# Patient Record
Sex: MALE
[De-identification: names, ages, dates, MRNs, and addresses within clinical notes are randomized per-mention and may not be internally consistent; named-entity substitution may affect disease eponyms.]

---

## 2019-03-05 ENCOUNTER — TRANSCRIPTION ENCOUNTER (OUTPATIENT)
Age: 79
End: 2019-03-05

## 2019-03-05 ENCOUNTER — INPATIENT (INPATIENT)
Facility: HOSPITAL | Age: 79
LOS: 0 days | Discharge: HOME | DRG: 395 | End: 2019-03-06
Attending: INTERNAL MEDICINE | Admitting: INTERNAL MEDICINE
Payer: COMMERCIAL

## 2019-03-05 VITALS
HEIGHT: 69 IN | HEART RATE: 67 BPM | DIASTOLIC BLOOD PRESSURE: 82 MMHG | WEIGHT: 175.05 LBS | SYSTOLIC BLOOD PRESSURE: 136 MMHG | TEMPERATURE: 97 F | RESPIRATION RATE: 20 BRPM | OXYGEN SATURATION: 99 %

## 2019-03-05 DIAGNOSIS — K62.5 HEMORRHAGE OF ANUS AND RECTUM: ICD-10-CM

## 2019-03-05 DIAGNOSIS — Z90.49 ACQUIRED ABSENCE OF OTHER SPECIFIED PARTS OF DIGESTIVE TRACT: Chronic | ICD-10-CM

## 2019-03-05 LAB
ALBUMIN SERPL ELPH-MCNC: 4.7 G/DL — SIGNIFICANT CHANGE UP (ref 3.3–5.2)
ALP SERPL-CCNC: 62 U/L — SIGNIFICANT CHANGE UP (ref 40–120)
ALT FLD-CCNC: 24 U/L — SIGNIFICANT CHANGE UP
ANION GAP SERPL CALC-SCNC: 10 MMOL/L — SIGNIFICANT CHANGE UP (ref 5–17)
APTT BLD: 33.2 SEC — SIGNIFICANT CHANGE UP (ref 27.5–36.3)
AST SERPL-CCNC: 30 U/L — SIGNIFICANT CHANGE UP
BASOPHILS # BLD AUTO: 0 K/UL — SIGNIFICANT CHANGE UP (ref 0–0.2)
BASOPHILS NFR BLD AUTO: 0.5 % — SIGNIFICANT CHANGE UP (ref 0–2)
BILIRUB SERPL-MCNC: 0.4 MG/DL — SIGNIFICANT CHANGE UP (ref 0.4–2)
BLD GP AB SCN SERPL QL: SIGNIFICANT CHANGE UP
BUN SERPL-MCNC: 19 MG/DL — SIGNIFICANT CHANGE UP (ref 8–20)
CALCIUM SERPL-MCNC: 10.6 MG/DL — HIGH (ref 8.6–10.2)
CHLORIDE SERPL-SCNC: 102 MMOL/L — SIGNIFICANT CHANGE UP (ref 98–107)
CO2 SERPL-SCNC: 29 MMOL/L — SIGNIFICANT CHANGE UP (ref 22–29)
CREAT SERPL-MCNC: 1.31 MG/DL — HIGH (ref 0.5–1.3)
EOSINOPHIL # BLD AUTO: 0.1 K/UL — SIGNIFICANT CHANGE UP (ref 0–0.5)
EOSINOPHIL NFR BLD AUTO: 1.7 % — SIGNIFICANT CHANGE UP (ref 0–5)
GLUCOSE SERPL-MCNC: 108 MG/DL — SIGNIFICANT CHANGE UP (ref 70–115)
HCT VFR BLD CALC: 43.9 % — SIGNIFICANT CHANGE UP (ref 42–52)
HCT VFR BLD CALC: 44.7 % — SIGNIFICANT CHANGE UP (ref 42–52)
HGB BLD-MCNC: 14.9 G/DL — SIGNIFICANT CHANGE UP (ref 14–18)
HGB BLD-MCNC: 15.2 G/DL — SIGNIFICANT CHANGE UP (ref 14–18)
INR BLD: 1.02 RATIO — SIGNIFICANT CHANGE UP (ref 0.88–1.16)
LYMPHOCYTES # BLD AUTO: 1.1 K/UL — SIGNIFICANT CHANGE UP (ref 1–4.8)
LYMPHOCYTES # BLD AUTO: 16.8 % — LOW (ref 20–55)
MCHC RBC-ENTMCNC: 29.6 PG — SIGNIFICANT CHANGE UP (ref 27–31)
MCHC RBC-ENTMCNC: 29.8 PG — SIGNIFICANT CHANGE UP (ref 27–31)
MCHC RBC-ENTMCNC: 33.9 G/DL — SIGNIFICANT CHANGE UP (ref 32–36)
MCHC RBC-ENTMCNC: 34 G/DL — SIGNIFICANT CHANGE UP (ref 32–36)
MCV RBC AUTO: 87.1 FL — SIGNIFICANT CHANGE UP (ref 80–94)
MCV RBC AUTO: 87.6 FL — SIGNIFICANT CHANGE UP (ref 80–94)
MONOCYTES # BLD AUTO: 0.7 K/UL — SIGNIFICANT CHANGE UP (ref 0–0.8)
MONOCYTES NFR BLD AUTO: 10.9 % — HIGH (ref 3–10)
NEUTROPHILS # BLD AUTO: 4.6 K/UL — SIGNIFICANT CHANGE UP (ref 1.8–8)
NEUTROPHILS NFR BLD AUTO: 69.9 % — SIGNIFICANT CHANGE UP (ref 37–73)
OB PNL STL: POSITIVE
PLATELET # BLD AUTO: 230 K/UL — SIGNIFICANT CHANGE UP (ref 150–400)
PLATELET # BLD AUTO: 232 K/UL — SIGNIFICANT CHANGE UP (ref 150–400)
POTASSIUM SERPL-MCNC: 4.2 MMOL/L — SIGNIFICANT CHANGE UP (ref 3.5–5.3)
POTASSIUM SERPL-SCNC: 4.2 MMOL/L — SIGNIFICANT CHANGE UP (ref 3.5–5.3)
PROT SERPL-MCNC: 8.2 G/DL — SIGNIFICANT CHANGE UP (ref 6.6–8.7)
PROTHROM AB SERPL-ACNC: 11.7 SEC — SIGNIFICANT CHANGE UP (ref 10–12.9)
RBC # BLD: 5.04 M/UL — SIGNIFICANT CHANGE UP (ref 4.6–6.2)
RBC # BLD: 5.1 M/UL — SIGNIFICANT CHANGE UP (ref 4.6–6.2)
RBC # FLD: 13.9 % — SIGNIFICANT CHANGE UP (ref 11–15.6)
RBC # FLD: 13.9 % — SIGNIFICANT CHANGE UP (ref 11–15.6)
SODIUM SERPL-SCNC: 141 MMOL/L — SIGNIFICANT CHANGE UP (ref 135–145)
TYPE + AB SCN PNL BLD: SIGNIFICANT CHANGE UP
WBC # BLD: 6.6 K/UL — SIGNIFICANT CHANGE UP (ref 4.8–10.8)
WBC # BLD: 8 K/UL — SIGNIFICANT CHANGE UP (ref 4.8–10.8)
WBC # FLD AUTO: 6.6 K/UL — SIGNIFICANT CHANGE UP (ref 4.8–10.8)
WBC # FLD AUTO: 8 K/UL — SIGNIFICANT CHANGE UP (ref 4.8–10.8)

## 2019-03-05 PROCEDURE — 99285 EMERGENCY DEPT VISIT HI MDM: CPT

## 2019-03-05 RX ORDER — SOD SULF/SODIUM/NAHCO3/KCL/PEG
1000 SOLUTION, RECONSTITUTED, ORAL ORAL
Qty: 0 | Refills: 0 | Status: COMPLETED | OUTPATIENT
Start: 2019-03-05 | End: 2019-03-05

## 2019-03-05 RX ORDER — SODIUM CHLORIDE 9 MG/ML
3 INJECTION INTRAMUSCULAR; INTRAVENOUS; SUBCUTANEOUS ONCE
Qty: 0 | Refills: 0 | Status: COMPLETED | OUTPATIENT
Start: 2019-03-05 | End: 2019-03-05

## 2019-03-05 RX ORDER — SODIUM CHLORIDE 9 MG/ML
1000 INJECTION INTRAMUSCULAR; INTRAVENOUS; SUBCUTANEOUS
Qty: 0 | Refills: 0 | Status: DISCONTINUED | OUTPATIENT
Start: 2019-03-05 | End: 2019-03-06

## 2019-03-05 RX ADMIN — Medication 1000 MILLILITER(S): at 17:36

## 2019-03-05 RX ADMIN — Medication 1000 MILLILITER(S): at 21:49

## 2019-03-05 RX ADMIN — SODIUM CHLORIDE 40 MILLILITER(S): 9 INJECTION INTRAMUSCULAR; INTRAVENOUS; SUBCUTANEOUS at 16:23

## 2019-03-05 RX ADMIN — SODIUM CHLORIDE 3 MILLILITER(S): 9 INJECTION INTRAMUSCULAR; INTRAVENOUS; SUBCUTANEOUS at 10:00

## 2019-03-05 NOTE — ED ADULT NURSE NOTE - CHPI ED NUR SYMPTOMS NEG
no fever/no nausea/no pain/no chills/no dizziness/no decreased eating/drinking/no tingling/no vomiting/no weakness

## 2019-03-05 NOTE — ED ADULT NURSE REASSESSMENT NOTE - NS ED NURSE REASSESS COMMENT FT1
Pt received from ED RN Daylin . Awake in bed. alert and oriented x4 VSS at this time. Presents in bed, in no acute distress.  Plan of care discussed with patient. Movi Prep to being at 6pm.. Safety maintained.
pt in no apparent distress, plan of care explained to pt, pt verbalized understanding. pt denies any pain at the moment.
pt status unchanged, refer to flowsheet and chart, pt safety maintained, pt hemodynamically stable. Pt awaiting MD for further evaluation. Pt resting comfortably. Pt safety maintained will continue to monitor. POC discussed with pt

## 2019-03-05 NOTE — ED ADULT NURSE NOTE - OBJECTIVE STATEMENT
Pt presents to ED with c/o rectal bleed x 3 days. Pt has no associated symptoms and denies any pain. Pt aox3, in no apparent distress, and has no other medical complaints at this time. Pt has hx of HTN

## 2019-03-05 NOTE — ED PROVIDER NOTE - OBJECTIVE STATEMENT
77 y/o M presents with intermittent rectal bleeding since sunday no pain no abd pain BRBPR on VANESSA no thinners     no fevers

## 2019-03-05 NOTE — H&P ADULT - ASSESSMENT
The patient is a 78 year old male who presented to the Er with complaints of bright red bleeding per rectum. According to the patient 3 days ago after he shovelled snow he noted bright red blood on his undergarments. He continued to have painless rectal bleeding which subsequently has slowed since then. No association with his bowel movements, denies abdominal pain, nausea or vomiting. In the ER, Hb/hct of 15. Bright red blood in the rectal vault on examination. Admitted for colonoscopy in am       Assessment/Plan:    1. Lower GI bleed: CBC Q6 hours, CLD, NPO after midnight for colonoscopy in am    2. SONIYA: baseline creatinine unknown. Gentle iv hydration  repeat BMP In am    VTE_ SCDS bilaterally    Plan discussed with the patient and his wife at the bedside in detail.

## 2019-03-05 NOTE — H&P ADULT - HISTORY OF PRESENT ILLNESS
The patient is a 78 year old male who presented to the Er with complaints of bright red bleeding per rectum. According to the patient 3 days ago after he shovelled snow he noted bright red blood on his undergarments. He continued to have painless rectal bleeding which subsequently has slowed since then. No association with his bowel movements, denies abdominal pain, nausea or vomiting. In the ER, Hb/hct of 15. Bright red blood in the rectal vault on examination. Admitted for colonoscopy in am

## 2019-03-05 NOTE — CONSULT NOTE ADULT - PROBLEM SELECTOR RECOMMENDATION 9
The pattern of his bleeding is most c/w ongoing hemorrhoidal bleeding or at the very least pathology in the rectum-cant exclude ulcer, proctitis or neoplasm. Suggest prep today for colonoscopy in AM.

## 2019-03-05 NOTE — PATIENT PROFILE ADULT - FUNCTIONAL SCREEN CURRENT LEVEL: SWALLOWING (IF SCORE 2 OR MORE FOR ANY ITEM, CONSULT REHAB SERVICES), MLM)
DISPLAY PLAN FREE TEXT
0 = swallows foods/liquids without difficulty

## 2019-03-05 NOTE — CONSULT NOTE ADULT - SUBJECTIVE AND OBJECTIVE BOX
Patient is a 78y old  Male who presents with a chief complaint of rectal bleeding    HPI: 78 year old english speaking  male who noted that hsi underpants were wet while shoveling snow three days ago. Whe he looked there was bright red blood on the underpants. He has continued to ooze blood onto his undergarments throughout the day but has his usual brown daily BM without change. There is no blood in the commode. The H/H today is normal. He denies any abdominal pain, nausea or vomiting. There is no diarrhea or constipation. There is no rectal pain. There is no light headedness although he experienced some transient diaphoresis on the day the bleeding was first noted. He underwent a screening colonoscopy about 6-7 years ago by someone in Dr. Moya's group that was apparently unremarkable excpet for perhaps a small ulcer. There are no dyspeptic symptoms.      REVIEW OF SYSTEMS:    CONSTITUTIONAL: No fever, weight loss, or fatigue  EYES: No eye pain, visual disturbances, or discharge  ENMT:  No difficulty hearing, tinnitus, vertigo; No sinus or throat pain  NECK: No pain or stiffness  RESPIRATORY: No cough, wheezing, chills or hemoptysis; No shortness of breath  CARDIOVASCULAR: No chest pain, palpitations, dizziness, or leg swelling  GASTROINTESTINAL: as above  NEUROLOGICAL: No headaches, memory loss, loss of strength, numbness, or tremors  SKIN: No itching, burning, rashes, or lesions   LYMPH NODES: No enlarged glands  MUSCULOSKELETAL: No joint pain or swelling; No muscle, back, or extremity pain  PSYCHIATRIC: No depression, anxiety, mood swings, or difficulty sleeping  HEME/LYMPH: No easy bruising, or bleeding gums  ALLERY AND IMMUNOLOGIC: No hives or eczema      PAST MEDICAL & SURGICAL HISTORY:  hypertension  appendectomy        FAMILY HISTORY:      SOCIAL HISTORY:  Smoking Status: [ ] Current, [ ] Former, [ x] Never  Pack Years:  Alcohol Use: social at most    Home Medications:      MEDICATIONS:  MEDICATIONS  (STANDING):    MEDICATIONS  (PRN):      Allergies    No Known Allergies    Intolerances        Vital Signs Last 24 Hrs  T(C): 36.3 (05 Mar 2019 08:58), Max: 36.3 (05 Mar 2019 08:58)  T(F): 97.3 (05 Mar 2019 08:58), Max: 97.3 (05 Mar 2019 08:58)  HR: 62 (05 Mar 2019 12:19) (62 - 67)  BP: 127/80 (05 Mar 2019 12:19) (127/80 - 136/82)  BP(mean): --  RR: 16 (05 Mar 2019 12:19) (16 - 20)  SpO2: 98% (05 Mar 2019 12:19) (98% - 99%)        PHYSICAL EXAM:    General: Well developed; well nourished; in no acute distress  HEENT: MMM, conjunctiva and sclera clear  Lungs: Clear  Heart: Rhythm Regular, No Murmurs  Gastrointestinal: Soft, non-tender non-distended; Normal bowel sounds; No rebound or guarding; No Organomegaly & No Masses  Extremities: Normal range of motion, No clubbing, cyanosis or edema  Neurological: Alert and oriented x3, Non-focal  Skin: Warm and dry. No obvious rash  Rectal: No Masses, fresh red blood in the rectal vault      LABS:                        15.2   6.6   )-----------( 232      ( 05 Mar 2019 10:34 )             44.7     03-05    141  |  102  |  19.0  ----------------------------<  108  4.2   |  29.0  |  1.31<H>    Ca    10.6<H>      05 Mar 2019 10:34    TPro  8.2  /  Alb  4.7  /  TBili  0.4  /  DBili  x   /  AST  30  /  ALT  24  /  AlkPhos  62  03-05

## 2019-03-05 NOTE — ED ADULT NURSE NOTE - NSIMPLEMENTINTERV_GEN_ALL_ED
Implemented All Universal Safety Interventions:  [place] to call system. Call bell, personal items and telephone within reach. Instruct patient to call for assistance. Room bathroom lighting operational. Non-slip footwear when patient is off stretcher. Physically safe environment: no spills, clutter or unnecessary equipment. Stretcher in lowest position, wheels locked, appropriate side rails in place.

## 2019-03-06 ENCOUNTER — TRANSCRIPTION ENCOUNTER (OUTPATIENT)
Age: 79
End: 2019-03-06

## 2019-03-06 VITALS
HEART RATE: 70 BPM | DIASTOLIC BLOOD PRESSURE: 68 MMHG | TEMPERATURE: 98 F | OXYGEN SATURATION: 98 % | RESPIRATION RATE: 18 BRPM | SYSTOLIC BLOOD PRESSURE: 122 MMHG

## 2019-03-06 LAB
ANION GAP SERPL CALC-SCNC: 12 MMOL/L — SIGNIFICANT CHANGE UP (ref 5–17)
BUN SERPL-MCNC: 17 MG/DL — SIGNIFICANT CHANGE UP (ref 8–20)
CALCIUM SERPL-MCNC: 9 MG/DL — SIGNIFICANT CHANGE UP (ref 8.6–10.2)
CHLORIDE SERPL-SCNC: 106 MMOL/L — SIGNIFICANT CHANGE UP (ref 98–107)
CO2 SERPL-SCNC: 25 MMOL/L — SIGNIFICANT CHANGE UP (ref 22–29)
CREAT SERPL-MCNC: 1.21 MG/DL — SIGNIFICANT CHANGE UP (ref 0.5–1.3)
GLUCOSE SERPL-MCNC: 85 MG/DL — SIGNIFICANT CHANGE UP (ref 70–115)
HCT VFR BLD CALC: 42.7 % — SIGNIFICANT CHANGE UP (ref 42–52)
HCT VFR BLD CALC: 43.3 % — SIGNIFICANT CHANGE UP (ref 42–52)
HGB BLD-MCNC: 14.3 G/DL — SIGNIFICANT CHANGE UP (ref 14–18)
HGB BLD-MCNC: 14.4 G/DL — SIGNIFICANT CHANGE UP (ref 14–18)
MCHC RBC-ENTMCNC: 29.1 PG — SIGNIFICANT CHANGE UP (ref 27–31)
MCHC RBC-ENTMCNC: 29.4 PG — SIGNIFICANT CHANGE UP (ref 27–31)
MCHC RBC-ENTMCNC: 33.3 G/DL — SIGNIFICANT CHANGE UP (ref 32–36)
MCHC RBC-ENTMCNC: 33.5 G/DL — SIGNIFICANT CHANGE UP (ref 32–36)
MCV RBC AUTO: 87.5 FL — SIGNIFICANT CHANGE UP (ref 80–94)
MCV RBC AUTO: 87.7 FL — SIGNIFICANT CHANGE UP (ref 80–94)
PLATELET # BLD AUTO: 207 K/UL — SIGNIFICANT CHANGE UP (ref 150–400)
PLATELET # BLD AUTO: 228 K/UL — SIGNIFICANT CHANGE UP (ref 150–400)
POTASSIUM SERPL-MCNC: 4.4 MMOL/L — SIGNIFICANT CHANGE UP (ref 3.5–5.3)
POTASSIUM SERPL-SCNC: 4.4 MMOL/L — SIGNIFICANT CHANGE UP (ref 3.5–5.3)
RBC # BLD: 4.87 M/UL — SIGNIFICANT CHANGE UP (ref 4.6–6.2)
RBC # BLD: 4.95 M/UL — SIGNIFICANT CHANGE UP (ref 4.6–6.2)
RBC # FLD: 14 % — SIGNIFICANT CHANGE UP (ref 11–15.6)
RBC # FLD: 14 % — SIGNIFICANT CHANGE UP (ref 11–15.6)
SODIUM SERPL-SCNC: 143 MMOL/L — SIGNIFICANT CHANGE UP (ref 135–145)
WBC # BLD: 6.2 K/UL — SIGNIFICANT CHANGE UP (ref 4.8–10.8)
WBC # BLD: 6.7 K/UL — SIGNIFICANT CHANGE UP (ref 4.8–10.8)
WBC # FLD AUTO: 6.2 K/UL — SIGNIFICANT CHANGE UP (ref 4.8–10.8)
WBC # FLD AUTO: 6.7 K/UL — SIGNIFICANT CHANGE UP (ref 4.8–10.8)

## 2019-03-06 PROCEDURE — 80053 COMPREHEN METABOLIC PANEL: CPT

## 2019-03-06 PROCEDURE — 86901 BLOOD TYPING SEROLOGIC RH(D): CPT

## 2019-03-06 PROCEDURE — 45378 DIAGNOSTIC COLONOSCOPY: CPT

## 2019-03-06 PROCEDURE — 82272 OCCULT BLD FECES 1-3 TESTS: CPT

## 2019-03-06 PROCEDURE — 80048 BASIC METABOLIC PNL TOTAL CA: CPT

## 2019-03-06 PROCEDURE — 36415 COLL VENOUS BLD VENIPUNCTURE: CPT

## 2019-03-06 PROCEDURE — 85730 THROMBOPLASTIN TIME PARTIAL: CPT

## 2019-03-06 PROCEDURE — 85027 COMPLETE CBC AUTOMATED: CPT

## 2019-03-06 PROCEDURE — 99238 HOSP IP/OBS DSCHRG MGMT 30/<: CPT

## 2019-03-06 PROCEDURE — 86850 RBC ANTIBODY SCREEN: CPT

## 2019-03-06 PROCEDURE — 99285 EMERGENCY DEPT VISIT HI MDM: CPT

## 2019-03-06 PROCEDURE — 86900 BLOOD TYPING SEROLOGIC ABO: CPT

## 2019-03-06 PROCEDURE — 85610 PROTHROMBIN TIME: CPT

## 2019-03-06 RX ORDER — HYDROCORTISONE 20 MG
25 TABLET ORAL
Qty: 10 | Refills: 0 | OUTPATIENT
Start: 2019-03-06

## 2019-03-06 RX ADMIN — SODIUM CHLORIDE 40 MILLILITER(S): 9 INJECTION INTRAMUSCULAR; INTRAVENOUS; SUBCUTANEOUS at 05:45

## 2019-03-06 NOTE — DISCHARGE NOTE ADULT - CARE PLAN
Principal Discharge DX:	Blood per rectum  Goal:	resolutions of sxs, evaluate source of bleeding  Assessment and plan of treatment:	s/p colonoscopy Principal Discharge DX:	Blood per rectum  Goal:	resolutions of sxs, evaluate source of bleeding  Assessment and plan of treatment:	s/p colonoscopy - internal hemorrhoid - start Hemorrhoidal cream

## 2019-03-06 NOTE — BRIEF OPERATIVE NOTE - OPERATION/FINDINGS
Universal diverticulosis with extensive diverticuli in left colon. Large thrombosed internal hemorrhoids noted on retroflexion. Universal diverticulosis with extensive diverticuli in left colon. Large thrombosed internal hemorrhoids noted on retroflexion. Prep quality was good. Withdrawal time: 06:30

## 2019-03-06 NOTE — DISCHARGE NOTE ADULT - PLAN OF CARE
resolutions of sxs, evaluate source of bleeding s/p colonoscopy s/p colonoscopy - internal hemorrhoid - start Hemorrhoidal cream

## 2019-03-06 NOTE — DISCHARGE NOTE ADULT - MEDICATION SUMMARY - MEDICATIONS TO TAKE
I will START or STAY ON the medications listed below when I get home from the hospital:    Colocort 100 mg/60 mL rectal suspension  -- 25 microgram(s) rectally prn   -- For rectal use only.  It is very important that you take or use this exactly as directed.  Do not skip doses or discontinue unless directed by your doctor.  Obtain medical advice before taking any non-prescription drugs as some may affect the action of this medication.  Shake well before use.    -- Indication: For Hemorrhoid

## 2019-03-06 NOTE — DISCHARGE NOTE ADULT - HOSPITAL COURSE
The patient is a 78 year old male who presented to the Er with complaints of bright red bleeding per rectum. According to the patient 3 days ago after he shovelled snow he noted bright red blood on his undergarments. He continued to have painless rectal bleeding which subsequently has slowed since then. No association with his bowel movements, denies abdominal pain, nausea or vomiting. In the ER, Hb/hct of 15. Bright red blood in the rectal vault on examination. Admitted for colonoscopy in today.    GI performed colonoscopy- post-op dx- Diverticulosis of colon without hemorrhage, internal hemorrhoids  Pt. likely bled from internal hemorrhoids. Low volume hematochezia likely  internal hemorrhoidal in etiology. OK to send pt. home today from a GI standpoint on Hydrocortisone 25 mg. suppositories, one AL QHS PRN further internal hemorrhoidal bleeding. Ok to re feed pt. today. OPT f/u in our office in 4 to 6 weeks.    Pt did well post-op                          14.3   6.2   )-----------( 228      ( 06 Mar 2019 08:52 )             42.7 The patient is a 78 year old male who presented to the Er with complaints of bright red bleeding per rectum. According to the patient 3 days ago after he shovelled snow he noted bright red blood on his undergarments. He continued to have painless rectal bleeding which subsequently has slowed since then. No association with his bowel movements, denies abdominal pain, nausea or vomiting. In the ER, Hb/hct of 15. Bright red blood in the rectal vault on examination. s/p  colonoscopy- post-op dx- Diverticulosis of colon without hemorrhage, internal hemorrhoids  Pt. likely bled from internal hemorrhoids. Low volume hematochezia likely  internal hemorrhoidal in etiology. OK to send pt. home today from a GI standpoint on Hydrocortisone 25 mg. suppositories, one [place] QHS PRN further internal hemorrhoidal bleeding. OPT f/u in our office in 4 to 6 weeks.    Pt did well post-op                          14.3   6.2   )-----------( 228      ( 06 Mar 2019 08:52 )             42.7

## 2019-03-06 NOTE — DISCHARGE NOTE ADULT - CARE PROVIDER_API CALL
Osorio Navarro)  Gastroenterology; Internal Medicine  [address] [facility] [address] [facility] [address]  [place] [address]  Phone: [phone]  Fax: [phone]  Follow Up Time:

## 2019-03-06 NOTE — PROGRESS NOTE ADULT - SUBJECTIVE AND OBJECTIVE BOX
MARYAM SIMEON    [id number]    78y      Male    INTERVAL HPI/OVERNIGHT EVENTS:  Pt seen resting this morning.  No new complaints.  Awaiting colonoscopy.  Denies any more BRBPR. Denies abd pain/ n/v.  Denies prior hx of similar episodes.    REVIEW OF SYSTEMS:    CONSTITUTIONAL: No fever, weight loss, or fatigue  RESPIRATORY: No cough, wheezing, hemoptysis; No shortness of breath  CARDIOVASCULAR: No chest pain, palpitations  GASTROINTESTINAL: No abdominal or epigastric pain. No nausea, vomiting  NEUROLOGICAL: No headaches, memory loss, loss of strength.    Vital Signs Last 24 Hrs  T(C): 36.7 (06 Mar 2019 07:29), Max: 37 (05 Mar 2019 15:33)  T(F): 98.1 (06 Mar 2019 07:29), Max: 98.6 (05 Mar 2019 15:33)  HR: 62 (06 Mar 2019 07:29) (62 - 82)  BP: 105/65 (06 Mar 2019 07:29) (105/65 - 139/83)  RR: 17 (06 Mar 2019 07:29) (16 - 18)  SpO2: 98% (06 Mar 2019 07:29) (96% - 98%)    PHYSICAL EXAM:    GENERAL: NAD, well-groomed  HEENT: PERRL, +EOMI  NECK: soft, Supple, No JVD,   CHEST/LUNG: Clear to ascultation bilaterally; No wheezing  HEART: S1S2+, Regular rate and rhythm; No murmurs, rubs, or gallops  ABDOMEN: Soft, Nontender, Nondistended; Bowel sounds present  EXTREMITIES:  2+ Peripheral Pulses, No clubbing, cyanosis, or edema  SKIN: No rashes or lesions  NEURO: AAOX3, no focal deficits, no motor r sensory loss  PSYCH: normal mood      LABS:                        14.3   6.2   )-----------( 228      ( 06 Mar 2019 08:52 )             42.7     03-06    143  |  106  |  17.0  ----------------------------<  85  4.4   |  25.0  |  1.21    Ca    9.0      06 Mar 2019 08:52    TPro  8.2  /  Alb  4.7  /  TBili  0.4  /  DBili  x   /  AST  30  /  ALT  24  /  AlkPhos  62  03-05    PT/INR - ( 05 Mar 2019 10:34 )   PT: 11.7 sec;   INR: 1.02 ratio         PTT - ( 05 Mar 2019 10:34 )  PTT:33.2 sec        MEDICATIONS  (STANDING):  sodium chloride 0.9%. 1000 milliLiter(s) (75 mL/Hr) IV Continuous <Continuous>

## 2019-03-06 NOTE — BRIEF OPERATIVE NOTE - POST-OP DX
Diverticulosis of colon without hemorrhage  03/06/2019    Active  Osorio Navarro  Internal hemorrhoids with complication  03/06/2019    Osorio Quinonez

## 2019-03-06 NOTE — DISCHARGE NOTE ADULT - ADDITIONAL INSTRUCTIONS
Hydrocortisone 25 mg. suppositories, one [place] QHS PRN further internal hemorrhoidal bleeding. Follow-up with Gastroenerologist in office in 4 to 6 weeks.

## 2019-03-06 NOTE — PROGRESS NOTE ADULT - ATTENDING COMMENTS
Pt is seen and examined, feeling better, no BM, schedule for colonoscopy today  exam- abd- soft, nt, bs+ve  chest - cta b/l  cvs- s1/s2 audible    h&H stable

## 2019-03-06 NOTE — PROGRESS NOTE ADULT - SUBJECTIVE AND OBJECTIVE BOX
The patient is a 78y old male who presents with a complaint of rectal bleeding of bright red   blood.  His stool was positive for occult blood.   He is scheduled to have a COLONOSCOPY.     (05 Mar 2019 14:12)      PAST MEDICAL HISTORY:  Hypertension x 2 years      PAST SURGICAL HISTORY:  Appendectomy - 50 years ago      MEDICATIONS  (STANDING):  sodium chloride 0.9%. 1000 milliLiter(s) (40 mL/Hr) IV Continuous <Continuous>    MEDICATIONS  (PRN):      Allergies:     No Known Allergies      SOCIAL HISTORY:     He drinks red wine or scotch once a week or less.  He quit smoking at                                 age 23.  He never used illicit drugs.     Height (cm): 175.26 (03-05-19 @ 08:58)  Weight (kg): 79.4 (03-05-19 @ 08:58)  BMI (kg/m2): 25.8 (03-05-19 @ 08:58)                          14.4   6.7   )-----------( 207      ( 06 Mar 2019 02:57 )             43.3       PT/INR - ( 05 Mar 2019 10:34 )   PT: 11.7 sec;   INR: 1.02 ratio         PTT - ( 05 Mar 2019 10:34 )  PTT:33.2 sec    03-05    141  |  102  |  19.0  ----------------------------<  108  4.2   |  29.0  |  1.31<H>    Ca    10.6<H>      05 Mar 2019 10:34    TPro  8.2  /  Alb  4.7  /  TBili  0.4  /  DBili  x   /  AST  30  /  ALT  24  /  AlkPhos  62  03-05    EKG:  -  None    TT ECHO:  -  None    ASA # =  2    Mallampati # = 2

## 2019-03-06 NOTE — PROGRESS NOTE ADULT - ASSESSMENT
The patient is a 78 year old male who presented to the Er with complaints of bright red bleeding per rectum. According to the patient 3 days ago after he shovelled snow he noted bright red blood on his undergarments. He continued to have painless rectal bleeding which subsequently has slowed since then. No association with his bowel movements, denies abdominal pain, nausea or vomiting. In the ER, Hb/hct of 15. Bright red blood in the rectal vault on examination. Admitted for colonoscopy in today.      Assessment/Plan:    1. Lower GI bleed: CBC Q6 hours,  H/H stable. CLD, NPO after midnight for colonoscopy today    2. SONIYA: baseline creatinine unknown. Gentle iv hydration  repeat BMP slightly improved to 1.21    VTE_ SCDS bilaterally The patient is a 78 year old male who presented to the Er with complaints of bright red bleeding per rectum. According to the patient 3 days ago after he shovelled snow he noted bright red blood on his undergarments. He continued to have painless rectal bleeding which subsequently has slowed since then. No association with his bowel movements, denies abdominal pain, nausea or vomiting. In the ER, Hb/hct of 15. Bright red blood in the rectal vault on examination. Admitted for colonoscopy in today.      Assessment/Plan:    1. Lower GI bleed: CBC Q6 hours,  H/H stable. CLD, NPO after midnight for colonoscopy today      VTE_ SCDS bilaterally

## 2019-03-06 NOTE — BRIEF OPERATIVE NOTE - COMMENTS
Pt. likely bled from internal hemorrhoids. Low volume hematochezia likely  internal hemorrhoidal in etiology. OK to send pt. home today from a GI standpoint on Hydrocortisone 25 mg. suppositories, one [place] QHS PRN further internal hemorrhoidal bleeding. Ok to re feed pt. today. OPT f/u in our office in 4 to 6 weeks.

## 2019-03-06 NOTE — DISCHARGE NOTE ADULT - PATIENT PORTAL LINK FT
You can access the [facility] Patient Portal, offered by [facility], by registering with the following website: [email or web address]

## 2019-05-08 NOTE — CDI QUERY NOTE - NSCDIOTHERTXTBX_GEN_ALL_CORE_HH
[facility] uses the KDIGO criteria (0.3 increase in Crt or 1.5X the baseline retrospectively) to determine SONIYA.  This patient does not meet that criteria.  Please document an addendum to the D/C summary your criteria for the diagnosis of SONIYA, or document if SONIYA was ruled out.  Thank you      Creatinine Trend:  Creatinine, Serum: 1.21 mg/dL (03.06.19 @ 08:52)  Creatinine, Serum: 1.31 mg/dL (03.05.19 @ 10:34)      Chart Documentation:    H&P:  2. SONIYA: baseline creatinine unknown. Gentle iv hydration  repeat BMP In am

## 2020-11-11 ENCOUNTER — APPOINTMENT (OUTPATIENT)
Dept: NEPHROLOGY | Facility: CLINIC | Age: 80
End: 2020-11-11
Payer: MEDICARE

## 2020-11-11 VITALS
HEIGHT: 69 IN | WEIGHT: 169 LBS | SYSTOLIC BLOOD PRESSURE: 124 MMHG | BODY MASS INDEX: 25.03 KG/M2 | HEART RATE: 60 BPM | DIASTOLIC BLOOD PRESSURE: 74 MMHG | TEMPERATURE: 97.2 F

## 2020-11-11 DIAGNOSIS — Z86.79 PERSONAL HISTORY OF OTHER DISEASES OF THE CIRCULATORY SYSTEM: ICD-10-CM

## 2020-11-11 DIAGNOSIS — I10 ESSENTIAL (PRIMARY) HYPERTENSION: ICD-10-CM

## 2020-11-11 DIAGNOSIS — Z78.9 OTHER SPECIFIED HEALTH STATUS: ICD-10-CM

## 2020-11-11 DIAGNOSIS — N18.9 CHRONIC KIDNEY DISEASE, UNSPECIFIED: ICD-10-CM

## 2020-11-11 PROCEDURE — 99072 ADDL SUPL MATRL&STAF TM PHE: CPT

## 2020-11-11 PROCEDURE — 99205 OFFICE O/P NEW HI 60 MIN: CPT

## 2020-11-11 NOTE — ASSESSMENT
[FreeTextEntry1] : 1) CKD stage 3\par Labs from PCP's office reviewed\par SC[facility] ~1.23\[facility]\par he likely has age related CKD; also has HTN which is contributing\par UA bland\par Obtain Renal US\par Avoid NSAIDs\par \par 2) HTN\par BP stable\par Pt to update us with medication name once he gets home\par \par RTC in 4 months

## 2020-11-11 NOTE — PHYSICAL EXAM
[General Appearance - Alert] : alert [General Appearance - In No Acute Distress] : in no acute distress [Sclera] : the sclera and conjunctiva were normal [PERRL With Normal Accommodation] : pupils were equal in size, round, and reactive to light [Outer Ear] : the ears and nose were normal in appearance [Neck Appearance] : the appearance of the neck was normal [Neck Cervical Mass (___cm)] : no neck mass was observed [Jugular Venous Distention Increased] : there was no jugular-venous distention [Auscultation Breath Sounds / Voice Sounds] : lungs were clear to auscultation bilaterally [Heart Rate And Rhythm] : heart rate was normal and rhythm regular [Heart Sounds] : normal S1 and S2 [Murmurs] : no murmurs [Edema] : there was no peripheral edema [Abdomen Soft] : soft [Abdomen Tenderness] : non-tender [No CVA Tenderness] : no ~M costovertebral angle tenderness [Abnormal Walk] : normal gait [Nail Clubbing] : no clubbing  or cyanosis of the fingernails [Skin Color & Pigmentation] : normal skin color and pigmentation [] : no rash [Deep Tendon Reflexes (DTR)] : deep tendon reflexes were 2+ and symmetric [Sensation] : the sensory exam was normal to light touch and pinprick [No Focal Deficits] : no focal deficits [Oriented To Time, Place, And Person] : oriented to person, place, and time [Impaired Insight] : insight and judgment were intact [Affect] : the affect was normal [Occult Blood Positive] : stool was negative for occult blood

## 2020-11-11 NOTE — HISTORY OF PRESENT ILLNESS
[FreeTextEntry1] : 80 year old male pt with PMH of HTN, Glaucoma presenting for intiial evaluation of renal insufficiency.\par PCP; Dr Lilly Castillo\par \par Pt seen and examined\par States he feels well\par Dx with HTN about 4 years; takes 1 medication but cant recall name\par Denies NSAID use\par \par Originally from [place]; retired \par  for 50 years; has 5 kids\par \par

## 2020-11-30 ENCOUNTER — OUTPATIENT (OUTPATIENT)
Dept: OUTPATIENT SERVICES | Facility: HOSPITAL | Age: 80
LOS: 1 days | End: 2020-11-30

## 2020-11-30 ENCOUNTER — APPOINTMENT (OUTPATIENT)
Dept: ULTRASOUND IMAGING | Facility: CLINIC | Age: 80
End: 2020-11-30
Payer: MEDICARE

## 2020-11-30 DIAGNOSIS — Z90.49 ACQUIRED ABSENCE OF OTHER SPECIFIED PARTS OF DIGESTIVE TRACT: Chronic | ICD-10-CM

## 2020-11-30 DIAGNOSIS — Z00.00 ENCOUNTER FOR GENERAL ADULT MEDICAL EXAMINATION WITHOUT ABNORMAL FINDINGS: ICD-10-CM

## 2020-11-30 PROCEDURE — 76775 US EXAM ABDO BACK WALL LIM: CPT | Mod: 26

## 2021-02-10 ENCOUNTER — APPOINTMENT (OUTPATIENT)
Dept: NEPHROLOGY | Facility: CLINIC | Age: 81
End: 2021-02-10

## 2023-01-20 ENCOUNTER — OFFICE (OUTPATIENT)
Dept: URBAN - METROPOLITAN AREA CLINIC 94 | Facility: CLINIC | Age: 83
Setting detail: OPHTHALMOLOGY
End: 2023-01-20
Payer: COMMERCIAL

## 2023-01-20 DIAGNOSIS — H40.1122: ICD-10-CM

## 2023-01-20 DIAGNOSIS — H25.13: ICD-10-CM

## 2023-01-20 DIAGNOSIS — H40.1112: ICD-10-CM

## 2023-01-20 DIAGNOSIS — H04.123: ICD-10-CM

## 2023-01-20 PROCEDURE — 99213 OFFICE O/P EST LOW 20 MIN: CPT | Performed by: OPHTHALMOLOGY

## 2023-01-20 PROCEDURE — 92250 FUNDUS PHOTOGRAPHY W/I&R: CPT | Performed by: OPHTHALMOLOGY

## 2023-01-20 ASSESSMENT — REFRACTION_MANIFEST
OD_ADD: +2.75
OS_AXIS: 105
OS_SPHERE: +2.75
OD_VA1: 20/20
OD_CYLINDER: -1.00
OS_VA1: 20/20
OD_VA2: 20/20
OD_AXIS: 120
OS_CYLINDER: -1.00
OS_VA2: 20/20
OD_SPHERE: +3.00
OS_ADD: +2.75

## 2023-01-20 ASSESSMENT — SUPERFICIAL PUNCTATE KERATITIS (SPK)
OD_SPK: 2+
OS_SPK: 1+

## 2023-01-20 ASSESSMENT — REFRACTION_CURRENTRX
OD_VPRISM_DIRECTION: BF
OS_AXIS: 117
OS_OVR_VA: 20/
OS_ADD: +3.25
OS_AXIS: 112
OS_OVR_VA: 20/
OS_SPHERE: +3.00
OD_ADD: +3.00
OD_SPHERE: +2.50
OD_SPHERE: +3.00
OD_ADD: +3.00
OD_ADD: +2.75
OS_SPHERE: +3.00
OS_CYLINDER: -0.75
OD_ADD: +3.00
OS_ADD: +3.00
OD_CYLINDER: -0.25
OD_VPRISM_DIRECTION: BF
OS_SPHERE: +2.75
OD_OVR_VA: 20/
OD_CYLINDER: -1.00
OS_CYLINDER: 0.00
OD_SPHERE: +3.00
OS_SPHERE: +3.50
OS_AXIS: 0
OS_VPRISM_DIRECTION: BF
OD_CYLINDER: -1.25
OS_ADD: +2.75
OD_AXIS: 122
OS_AXIS: 094
OD_CYLINDER: 0.00
OS_CYLINDER: -1.00
OS_ADD: +3.00
OD_CYLINDER: -1.00
OS_VPRISM_DIRECTION: BF
OS_AXIS: 104
OD_OVR_VA: 20/
OS_VPRISM_DIRECTION: BF
OD_VPRISM_DIRECTION: BF
OS_SPHERE: +2.50
OD_AXIS: 116
OD_AXIS: 092
OD_AXIS: 119
OD_OVR_VA: 20/
OD_SPHERE: +3.50
OS_CYLINDER: -1.25
OD_SPHERE: +3.25
OS_CYLINDER: -1.25
OD_AXIS: 0
OS_OVR_VA: 20/

## 2023-01-20 ASSESSMENT — PACHYMETRY
OS_CT_CORRECTION: 0
OD_CT_UM: 533
OS_CT_UM: 540
OD_CT_CORRECTION: 1

## 2023-01-20 ASSESSMENT — SPHEQUIV_DERIVED
OD_SPHEQUIV: 2.75
OS_SPHEQUIV: 2.25
OS_SPHEQUIV: 3.5
OD_SPHEQUIV: 2.5

## 2023-01-20 ASSESSMENT — AXIALLENGTH_DERIVED
OS_AL: 23.3604
OS_AL: 22.8924
OD_AL: 23.0827
OD_AL: 23.1763

## 2023-01-20 ASSESSMENT — CONFRONTATIONAL VISUAL FIELD TEST (CVF)
OD_FINDINGS: FULL
OS_FINDINGS: FULL

## 2023-01-20 ASSESSMENT — KERATOMETRY
OS_AXISANGLE_DEGREES: 015
OD_K1POWER_DIOPTERS: 41.50
OD_AXISANGLE_DEGREES: 026
OS_K1POWER_DIOPTERS: 41.25
METHOD_AUTO_MANUAL: AUTO
OD_K2POWER_DIOPTERS: 42.50
OS_K2POWER_DIOPTERS: 42.25

## 2023-01-20 ASSESSMENT — REFRACTION_AUTOREFRACTION
OD_AXIS: 131
OD_SPHERE: +3.50
OS_SPHERE: +4.25
OD_CYLINDER: -1.50
OS_CYLINDER: -1.50
OS_AXIS: 102

## 2023-01-20 ASSESSMENT — TONOMETRY
OD_IOP_MMHG: 14
OS_IOP_MMHG: 14
OS_IOP_MMHG: 15

## 2023-01-20 ASSESSMENT — VISUAL ACUITY
OD_BCVA: 20/40+1
OS_BCVA: 20/30-1

## 2023-05-26 ENCOUNTER — OFFICE (OUTPATIENT)
Dept: URBAN - METROPOLITAN AREA CLINIC 94 | Facility: CLINIC | Age: 83
Setting detail: OPHTHALMOLOGY
End: 2023-05-26
Payer: COMMERCIAL

## 2023-05-26 DIAGNOSIS — H25.13: ICD-10-CM

## 2023-05-26 DIAGNOSIS — H04.123: ICD-10-CM

## 2023-05-26 DIAGNOSIS — H40.1122: ICD-10-CM

## 2023-05-26 DIAGNOSIS — H40.1112: ICD-10-CM

## 2023-05-26 PROCEDURE — 92014 COMPRE OPH EXAM EST PT 1/>: CPT | Performed by: OPHTHALMOLOGY

## 2023-05-26 PROCEDURE — 92133 CPTRZD OPH DX IMG PST SGM ON: CPT | Performed by: OPHTHALMOLOGY

## 2023-05-26 ASSESSMENT — AXIALLENGTH_DERIVED
OS_AL: 22.8924
OD_AL: 23.0827
OS_AL: 23.3604
OD_AL: 23.1763

## 2023-05-26 ASSESSMENT — REFRACTION_MANIFEST
OS_VA2: 20/20
OS_ADD: +2.75
OD_VA1: 20/20
OS_VA1: 20/20
OD_SPHERE: +3.00
OS_CYLINDER: -1.00
OD_ADD: +2.75
OS_AXIS: 105
OD_VA2: 20/20
OS_SPHERE: +2.75
OD_AXIS: 120
OD_CYLINDER: -1.00

## 2023-05-26 ASSESSMENT — KERATOMETRY
OS_AXISANGLE_DEGREES: 015
OD_K2POWER_DIOPTERS: 42.50
OD_K1POWER_DIOPTERS: 41.50
METHOD_AUTO_MANUAL: AUTO
OS_K2POWER_DIOPTERS: 42.25
OD_AXISANGLE_DEGREES: 026
OS_K1POWER_DIOPTERS: 41.25

## 2023-05-26 ASSESSMENT — REFRACTION_CURRENTRX
OS_CYLINDER: -1.00
OD_ADD: +3.00
OS_AXIS: 112
OS_SPHERE: +3.00
OD_SPHERE: +3.50
OD_VPRISM_DIRECTION: BF
OD_AXIS: 0
OS_VPRISM_DIRECTION: BF
OS_CYLINDER: -1.25
OS_ADD: +3.25
OD_AXIS: 092
OS_AXIS: 094
OD_OVR_VA: 20/
OS_SPHERE: +2.50
OS_ADD: +3.00
OS_SPHERE: +3.50
OD_OVR_VA: 20/
OD_VPRISM_DIRECTION: BF
OD_CYLINDER: -1.00
OS_AXIS: 104
OD_ADD: +3.00
OD_SPHERE: +3.00
OS_AXIS: 117
OD_ADD: +2.75
OD_AXIS: 116
OD_SPHERE: +2.50
OD_CYLINDER: -0.25
OD_ADD: +3.00
OS_OVR_VA: 20/
OS_VPRISM_DIRECTION: BF
OS_OVR_VA: 20/
OS_ADD: +2.75
OS_VPRISM_DIRECTION: BF
OS_ADD: +3.00
OS_AXIS: 0
OS_OVR_VA: 20/
OD_AXIS: 119
OS_CYLINDER: -0.75
OD_CYLINDER: 0.00
OD_CYLINDER: -1.25
OD_VPRISM_DIRECTION: BF
OD_OVR_VA: 20/
OS_SPHERE: +2.75
OS_CYLINDER: -1.25
OD_SPHERE: +3.00
OS_CYLINDER: 0.00
OD_CYLINDER: -1.00
OS_SPHERE: +3.00
OD_AXIS: 122
OD_SPHERE: +3.25

## 2023-05-26 ASSESSMENT — SUPERFICIAL PUNCTATE KERATITIS (SPK)
OD_SPK: 2+
OS_SPK: 1+

## 2023-05-26 ASSESSMENT — VISUAL ACUITY
OD_BCVA: 20/40
OS_BCVA: 20/40+1

## 2023-05-26 ASSESSMENT — REFRACTION_AUTOREFRACTION
OD_AXIS: 131
OS_AXIS: 102
OD_SPHERE: +3.50
OS_CYLINDER: -1.50
OD_CYLINDER: -1.50
OS_SPHERE: +4.25

## 2023-05-26 ASSESSMENT — SPHEQUIV_DERIVED
OS_SPHEQUIV: 2.25
OD_SPHEQUIV: 2.5
OS_SPHEQUIV: 3.5
OD_SPHEQUIV: 2.75

## 2023-05-26 ASSESSMENT — PACHYMETRY
OD_CT_CORRECTION: 1
OS_CT_CORRECTION: 0
OD_CT_UM: 533
OS_CT_UM: 540

## 2023-05-26 ASSESSMENT — TONOMETRY
OS_IOP_MMHG: 14
OD_IOP_MMHG: 14

## 2023-05-26 ASSESSMENT — CONFRONTATIONAL VISUAL FIELD TEST (CVF)
OS_FINDINGS: FULL
OD_FINDINGS: FULL

## 2023-09-29 ENCOUNTER — OFFICE (OUTPATIENT)
Dept: URBAN - METROPOLITAN AREA CLINIC 94 | Facility: CLINIC | Age: 83
Setting detail: OPHTHALMOLOGY
End: 2023-09-29
Payer: COMMERCIAL

## 2023-09-29 DIAGNOSIS — H40.1112: ICD-10-CM

## 2023-09-29 DIAGNOSIS — H40.1122: ICD-10-CM

## 2023-09-29 DIAGNOSIS — H25.13: ICD-10-CM

## 2023-09-29 DIAGNOSIS — H04.123: ICD-10-CM

## 2023-09-29 PROCEDURE — 92083 EXTENDED VISUAL FIELD XM: CPT | Performed by: OPHTHALMOLOGY

## 2023-09-29 PROCEDURE — 92020 GONIOSCOPY: CPT | Performed by: OPHTHALMOLOGY

## 2023-09-29 PROCEDURE — 92012 INTRM OPH EXAM EST PATIENT: CPT | Performed by: OPHTHALMOLOGY

## 2023-09-29 ASSESSMENT — REFRACTION_CURRENTRX
OD_CYLINDER: -1.25
OD_CYLINDER: -0.25
OS_AXIS: 112
OD_ADD: +3.00
OD_SPHERE: +3.00
OS_SPHERE: +2.50
OS_CYLINDER: -0.75
OD_OVR_VA: 20/
OS_SPHERE: +2.75
OS_OVR_VA: 20/
OD_ADD: +3.00
OS_SPHERE: +3.00
OD_AXIS: 0
OS_CYLINDER: -1.25
OS_OVR_VA: 20/
OS_CYLINDER: -1.25
OS_VPRISM_DIRECTION: BF
OS_VPRISM_DIRECTION: BF
OS_OVR_VA: 20/
OD_SPHERE: +3.25
OD_CYLINDER: -1.00
OD_ADD: +3.00
OD_SPHERE: +2.50
OS_AXIS: 0
OS_ADD: +3.00
OD_SPHERE: +3.50
OD_VPRISM_DIRECTION: BF
OD_VPRISM_DIRECTION: BF
OD_OVR_VA: 20/
OD_OVR_VA: 20/
OS_AXIS: 117
OD_AXIS: 116
OS_SPHERE: +3.50
OD_AXIS: 122
OS_SPHERE: +3.00
OS_ADD: +3.25
OD_CYLINDER: 0.00
OD_AXIS: 092
OD_AXIS: 119
OS_AXIS: 094
OS_VPRISM_DIRECTION: BF
OS_CYLINDER: -1.00
OS_CYLINDER: 0.00
OD_ADD: +2.75
OD_CYLINDER: -1.00
OD_SPHERE: +3.00
OS_ADD: +2.75
OS_ADD: +3.00
OS_AXIS: 104
OD_VPRISM_DIRECTION: BF

## 2023-09-29 ASSESSMENT — KERATOMETRY
OS_K2POWER_DIOPTERS: 42.25
OS_AXISANGLE_DEGREES: 180
METHOD_AUTO_MANUAL: AUTO
OD_K2POWER_DIOPTERS: 42.25
OD_K1POWER_DIOPTERS: 41.25
OD_AXISANGLE_DEGREES: 013
OS_K1POWER_DIOPTERS: 41.75

## 2023-09-29 ASSESSMENT — PACHYMETRY
OS_CT_CORRECTION: 0
OD_CT_UM: 533
OS_CT_UM: 540
OD_CT_CORRECTION: 1

## 2023-09-29 ASSESSMENT — AXIALLENGTH_DERIVED
OD_AL: 23.2653
OS_AL: 22.9437
OS_AL: 23.2707
OD_AL: 23.0308

## 2023-09-29 ASSESSMENT — REFRACTION_AUTOREFRACTION
OS_CYLINDER: -1.25
OD_SPHERE: +3.75
OD_CYLINDER: -1.25
OD_AXIS: 116
OS_AXIS: 095
OS_SPHERE: +3.75

## 2023-09-29 ASSESSMENT — REFRACTION_MANIFEST
OS_VA2: 20/20
OD_SPHERE: +3.00
OD_ADD: +2.75
OS_ADD: +2.75
OD_VA2: 20/20
OD_AXIS: 120
OD_CYLINDER: -1.00
OS_AXIS: 105
OS_CYLINDER: -1.00
OD_VA1: 20/20
OS_VA1: 20/20
OS_SPHERE: +2.75

## 2023-09-29 ASSESSMENT — TONOMETRY
OS_IOP_MMHG: 15
OD_IOP_MMHG: 15

## 2023-09-29 ASSESSMENT — VISUAL ACUITY
OD_BCVA: 20/40
OS_BCVA: 20/40-

## 2023-09-29 ASSESSMENT — SPHEQUIV_DERIVED
OS_SPHEQUIV: 2.25
OD_SPHEQUIV: 2.5
OS_SPHEQUIV: 3.125
OD_SPHEQUIV: 3.125

## 2023-09-29 ASSESSMENT — CONFRONTATIONAL VISUAL FIELD TEST (CVF)
OD_FINDINGS: FULL
OS_FINDINGS: FULL

## 2023-09-29 ASSESSMENT — SUPERFICIAL PUNCTATE KERATITIS (SPK)
OD_SPK: 2+
OS_SPK: 1+

## 2024-01-26 ENCOUNTER — OFFICE (OUTPATIENT)
Dept: URBAN - METROPOLITAN AREA CLINIC 94 | Facility: CLINIC | Age: 84
Setting detail: OPHTHALMOLOGY
End: 2024-01-26
Payer: MEDICARE

## 2024-01-26 DIAGNOSIS — H04.123: ICD-10-CM

## 2024-01-26 DIAGNOSIS — H40.1132: ICD-10-CM

## 2024-01-26 DIAGNOSIS — H25.13: ICD-10-CM

## 2024-01-26 PROCEDURE — 92014 COMPRE OPH EXAM EST PT 1/>: CPT | Performed by: OPHTHALMOLOGY

## 2024-01-26 PROCEDURE — 92250 FUNDUS PHOTOGRAPHY W/I&R: CPT | Performed by: OPHTHALMOLOGY

## 2024-01-26 ASSESSMENT — REFRACTION_MANIFEST
OD_VA1: 20/20
OD_ADD: +2.75
OD_CYLINDER: -1.00
OD_AXIS: 120
OS_CYLINDER: -1.00
OS_SPHERE: +2.75
OD_SPHERE: +3.00
OD_VA2: 20/20
OS_AXIS: 105
OS_VA2: 20/20
OS_VA1: 20/20
OS_ADD: +2.75

## 2024-01-26 ASSESSMENT — REFRACTION_CURRENTRX
OD_VPRISM_DIRECTION: BF
OD_SPHERE: +2.50
OS_CYLINDER: -1.25
OD_ADD: +3.00
OD_CYLINDER: -1.25
OS_AXIS: 0
OS_SPHERE: +3.50
OD_AXIS: 0
OD_VPRISM_DIRECTION: BF
OD_AXIS: 122
OS_SPHERE: +3.00
OS_SPHERE: +2.75
OD_ADD: +2.75
OD_SPHERE: +3.25
OS_SPHERE: +3.00
OS_AXIS: 117
OS_VPRISM_DIRECTION: BF
OD_SPHERE: +3.00
OS_ADD: +2.75
OD_ADD: +3.00
OS_OVR_VA: 20/
OD_OVR_VA: 20/
OD_VPRISM_DIRECTION: BF
OS_AXIS: 112
OD_AXIS: 116
OS_CYLINDER: -1.25
OD_OVR_VA: 20/
OD_ADD: +3.00
OS_VPRISM_DIRECTION: BF
OS_AXIS: 094
OS_ADD: +3.00
OD_CYLINDER: 0.00
OD_AXIS: 119
OD_SPHERE: +3.50
OS_CYLINDER: -0.75
OS_CYLINDER: 0.00
OS_SPHERE: +2.50
OD_CYLINDER: -1.00
OS_AXIS: 104
OS_VPRISM_DIRECTION: BF
OD_CYLINDER: -0.25
OS_CYLINDER: -1.00
OS_ADD: +3.25
OD_CYLINDER: -1.00
OD_SPHERE: +3.00
OS_OVR_VA: 20/
OD_OVR_VA: 20/
OS_ADD: +3.00
OD_AXIS: 092
OS_OVR_VA: 20/

## 2024-01-26 ASSESSMENT — CONFRONTATIONAL VISUAL FIELD TEST (CVF)
OS_FINDINGS: FULL
OD_FINDINGS: FULL

## 2024-01-26 ASSESSMENT — SPHEQUIV_DERIVED
OD_SPHEQUIV: 2.5
OS_SPHEQUIV: 3.25
OD_SPHEQUIV: 2.75
OS_SPHEQUIV: 2.25

## 2024-01-26 ASSESSMENT — REFRACTION_AUTOREFRACTION
OD_AXIS: 109
OS_AXIS: 103
OD_SPHERE: +3.25
OS_SPHERE: +3.75
OD_CYLINDER: -1.00
OS_CYLINDER: -1.00

## 2024-01-26 ASSESSMENT — SUPERFICIAL PUNCTATE KERATITIS (SPK)
OD_SPK: 2+
OS_SPK: 1+

## 2024-06-21 ENCOUNTER — OFFICE (OUTPATIENT)
Dept: URBAN - METROPOLITAN AREA CLINIC 94 | Facility: CLINIC | Age: 84
Setting detail: OPHTHALMOLOGY
End: 2024-06-21
Payer: MEDICARE

## 2024-06-21 DIAGNOSIS — H40.1132: ICD-10-CM

## 2024-06-21 PROCEDURE — 92014 COMPRE OPH EXAM EST PT 1/>: CPT | Performed by: OPHTHALMOLOGY

## 2024-06-21 PROCEDURE — 92133 CPTRZD OPH DX IMG PST SGM ON: CPT | Performed by: OPHTHALMOLOGY

## 2024-06-21 ASSESSMENT — CONFRONTATIONAL VISUAL FIELD TEST (CVF)
OD_FINDINGS: FULL
OS_FINDINGS: FULL

## 2024-10-25 ENCOUNTER — OFFICE (OUTPATIENT)
Dept: URBAN - METROPOLITAN AREA CLINIC 94 | Facility: CLINIC | Age: 84
Setting detail: OPHTHALMOLOGY
End: 2024-10-25
Payer: MEDICARE

## 2024-10-25 DIAGNOSIS — H25.13: ICD-10-CM

## 2024-10-25 DIAGNOSIS — H40.1122: ICD-10-CM

## 2024-10-25 DIAGNOSIS — H40.1112: ICD-10-CM

## 2024-10-25 PROCEDURE — 92083 EXTENDED VISUAL FIELD XM: CPT | Performed by: OPHTHALMOLOGY

## 2024-10-25 PROCEDURE — 92014 COMPRE OPH EXAM EST PT 1/>: CPT | Performed by: OPHTHALMOLOGY

## 2024-10-25 PROCEDURE — 92020 GONIOSCOPY: CPT | Performed by: OPHTHALMOLOGY

## 2024-10-25 ASSESSMENT — REFRACTION_CURRENTRX
OS_ADD: +3.00
OD_SPHERE: +3.00
OS_VPRISM_DIRECTION: BF
OS_AXIS: 112
OS_CYLINDER: -1.25
OD_AXIS: 116
OS_OVR_VA: 20/
OS_SPHERE: +2.50
OS_SPHERE: +3.00
OD_SPHERE: +3.50
OS_ADD: +3.25
OD_AXIS: 122
OS_CYLINDER: -1.25
OS_VPRISM_DIRECTION: BF
OS_CYLINDER: -0.75
OD_CYLINDER: -1.25
OD_AXIS: 0
OD_VPRISM_DIRECTION: BF
OD_CYLINDER: 0.00
OD_ADD: +3.00
OD_SPHERE: +3.00
OD_ADD: +2.75
OS_SPHERE: +3.00
OD_OVR_VA: 20/
OD_OVR_VA: 20/
OD_ADD: +3.00
OS_AXIS: 117
OS_ADD: +3.00
OD_CYLINDER: -1.00
OD_VPRISM_DIRECTION: BF
OS_AXIS: 0
OD_CYLINDER: -0.25
OS_VPRISM_DIRECTION: BF
OD_OVR_VA: 20/
OD_SPHERE: +2.50
OS_ADD: +2.75
OS_AXIS: 104
OS_SPHERE: +2.75
OD_AXIS: 119
OS_CYLINDER: 0.00
OD_AXIS: 092
OD_SPHERE: +3.25
OD_CYLINDER: -1.00
OS_SPHERE: +3.50
OD_VPRISM_DIRECTION: BF
OS_OVR_VA: 20/
OS_OVR_VA: 20/
OS_AXIS: 094
OD_ADD: +3.00
OS_CYLINDER: -1.00

## 2024-10-25 ASSESSMENT — REFRACTION_MANIFEST
OS_VA1: 20/20
OD_ADD: +2.75
OD_AXIS: 120
OS_SPHERE: +2.75
OD_VA1: 20/20
OS_VA2: 20/20
OS_AXIS: 105
OS_ADD: +2.75
OD_SPHERE: +3.00
OD_VA2: 20/20
OS_CYLINDER: -1.00
OD_CYLINDER: -1.00

## 2024-10-25 ASSESSMENT — CONFRONTATIONAL VISUAL FIELD TEST (CVF)
OS_FINDINGS: FULL
OD_FINDINGS: FULL

## 2024-10-25 ASSESSMENT — PACHYMETRY
OD_CT_UM: 533
OD_CT_CORRECTION: 1

## 2024-10-25 ASSESSMENT — REFRACTION_AUTOREFRACTION
OS_CYLINDER: -1.25
OS_SPHERE: +4.00
OD_AXIS: 117
OD_CYLINDER: -1.25
OD_SPHERE: +3.75
OS_AXIS: 098

## 2024-10-25 ASSESSMENT — KERATOMETRY
OS_K2POWER_DIOPTERS: 42.25
METHOD_AUTO_MANUAL: AUTO
OD_K2POWER_DIOPTERS: 42.00
OD_AXISANGLE_DEGREES: 015
OS_K1POWER_DIOPTERS: 41.75
OD_K1POWER_DIOPTERS: 41.25
OS_AXISANGLE_DEGREES: 010

## 2024-10-25 ASSESSMENT — VISUAL ACUITY
OD_BCVA: 20/30-1
OS_BCVA: 20/30-1

## 2024-10-25 ASSESSMENT — TONOMETRY
OD_IOP_MMHG: 13
OS_IOP_MMHG: 13

## 2024-10-25 ASSESSMENT — SUPERFICIAL PUNCTATE KERATITIS (SPK)
OD_SPK: 2+
OS_SPK: 1+

## 2025-03-14 ENCOUNTER — OFFICE (OUTPATIENT)
Dept: URBAN - METROPOLITAN AREA CLINIC 94 | Facility: CLINIC | Age: 85
Setting detail: OPHTHALMOLOGY
End: 2025-03-14
Payer: MEDICARE

## 2025-03-14 DIAGNOSIS — H04.123: ICD-10-CM

## 2025-03-14 DIAGNOSIS — H25.13: ICD-10-CM

## 2025-03-14 DIAGNOSIS — H40.1132: ICD-10-CM

## 2025-03-14 PROCEDURE — 92250 FUNDUS PHOTOGRAPHY W/I&R: CPT | Performed by: OPHTHALMOLOGY

## 2025-03-14 PROCEDURE — 92012 INTRM OPH EXAM EST PATIENT: CPT | Performed by: OPHTHALMOLOGY

## 2025-03-14 ASSESSMENT — REFRACTION_AUTOREFRACTION
OD_AXIS: 114
OS_SPHERE: +4.25
OS_CYLINDER: -1.50
OD_SPHERE: +3.75
OS_AXIS: 100
OD_CYLINDER: -1.50

## 2025-03-14 ASSESSMENT — REFRACTION_CURRENTRX
OS_AXIS: 104
OS_SPHERE: +2.75
OS_AXIS: 117
OD_CYLINDER: -1.00
OD_ADD: +3.00
OD_AXIS: 119
OS_CYLINDER: -0.75
OD_VPRISM_DIRECTION: BF
OS_SPHERE: +3.00
OS_AXIS: 094
OD_SPHERE: +3.25
OS_ADD: +3.00
OS_OVR_VA: 20/
OS_VPRISM_DIRECTION: BF
OD_AXIS: 092
OD_OVR_VA: 20/
OD_CYLINDER: -1.00
OD_AXIS: 122
OS_ADD: +3.00
OS_CYLINDER: -1.25
OD_CYLINDER: -1.25
OS_OVR_VA: 20/
OS_SPHERE: +3.50
OS_AXIS: 112
OD_CYLINDER: -0.25
OD_VPRISM_DIRECTION: BF
OD_OVR_VA: 20/
OS_ADD: +3.25
OD_AXIS: 0
OD_SPHERE: +3.00
OS_VPRISM_DIRECTION: BF
OD_SPHERE: +3.50
OD_ADD: +2.75
OD_CYLINDER: 0.00
OS_VPRISM_DIRECTION: BF
OS_CYLINDER: -1.25
OS_OVR_VA: 20/
OD_OVR_VA: 20/
OD_SPHERE: +2.50
OD_ADD: +3.00
OD_AXIS: 116
OD_SPHERE: +3.00
OS_AXIS: 0
OS_CYLINDER: -1.00
OD_ADD: +3.00
OS_ADD: +2.75
OS_CYLINDER: 0.00
OS_SPHERE: +3.00
OS_SPHERE: +2.50
OD_VPRISM_DIRECTION: BF

## 2025-03-14 ASSESSMENT — PACHYMETRY
OD_CT_UM: 533
OD_CT_CORRECTION: 1

## 2025-03-14 ASSESSMENT — REFRACTION_MANIFEST
OD_VA1: 20/20
OD_VA1: 20/20
OS_VA2: 20/20
OS_SPHERE: +2.75
OS_AXIS: 105
OD_ADD: +2.75
OD_SPHERE: +3.00
OD_CYLINDER: -1.00
OD_VA2: 20/20
OS_SPHERE: +2.75
OD_VA2: 20/20
OD_AXIS: 120
OS_VA2: 20/20
OD_SPHERE: +3.00
OS_CYLINDER: -1.00
OS_AXIS: 105
OD_ADD: +2.75
OS_CYLINDER: -1.00
OD_CYLINDER: -1.00
OS_ADD: +2.75
OD_AXIS: 120
OS_ADD: +2.75
OS_VA1: 20/20
OS_VA1: 20/20

## 2025-03-14 ASSESSMENT — KERATOMETRY
OD_K1POWER_DIOPTERS: 41.25
METHOD_AUTO_MANUAL: AUTO
OD_AXISANGLE_DEGREES: 012
OS_K2POWER_DIOPTERS: 42.50
OD_K2POWER_DIOPTERS: 42.25
OS_AXISANGLE_DEGREES: 008
OS_K1POWER_DIOPTERS: 41.50

## 2025-03-14 ASSESSMENT — SUPERFICIAL PUNCTATE KERATITIS (SPK)
OD_SPK: 2+
OS_SPK: 1+

## 2025-03-14 ASSESSMENT — VISUAL ACUITY
OS_BCVA: 20/30
OD_BCVA: 20/25

## 2025-03-14 ASSESSMENT — TONOMETRY
OS_IOP_MMHG: 13
OD_IOP_MMHG: 13

## 2025-03-14 ASSESSMENT — CONFRONTATIONAL VISUAL FIELD TEST (CVF)
OS_FINDINGS: FULL
OD_FINDINGS: FULL

## 2025-07-11 ENCOUNTER — OFFICE (OUTPATIENT)
Dept: URBAN - METROPOLITAN AREA CLINIC 94 | Facility: CLINIC | Age: 85
Setting detail: OPHTHALMOLOGY
End: 2025-07-11
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
## 2025-07-11 PROCEDURE — NO SHOW FE NO SHOW FEE: Performed by: OPHTHALMOLOGY
